# Patient Record
Sex: FEMALE | NOT HISPANIC OR LATINO | URBAN - METROPOLITAN AREA
[De-identification: names, ages, dates, MRNs, and addresses within clinical notes are randomized per-mention and may not be internally consistent; named-entity substitution may affect disease eponyms.]

---

## 2017-08-02 ENCOUNTER — IMPORTED ENCOUNTER (OUTPATIENT)
Dept: URBAN - METROPOLITAN AREA CLINIC 1 | Facility: CLINIC | Age: 32
End: 2017-08-02

## 2017-08-02 PROBLEM — H52.13: Noted: 2017-08-02

## 2017-08-02 PROCEDURE — S0621 ROUTINE OPHTHALMOLOGICAL EXA: HCPCS

## 2017-08-02 NOTE — PATIENT DISCUSSION
1. Myopia: Rx was given for correction if indicated and requested. 2. CC- Pt prefers dailies. Trialed- Acuvue Oasys 1 day Torics  (samples given. )3. Return for an appointment for a cc in 1 week with Dr. Montoya.

## 2018-09-19 ENCOUNTER — IMPORTED ENCOUNTER (OUTPATIENT)
Dept: URBAN - METROPOLITAN AREA CLINIC 1 | Facility: CLINIC | Age: 33
End: 2018-09-19

## 2018-09-19 PROBLEM — H52.13: Noted: 2018-09-19

## 2018-09-19 PROCEDURE — S0621 ROUTINE OPHTHALMOLOGICAL EXA: HCPCS

## 2018-09-19 NOTE — PATIENT DISCUSSION
Allergic Conjunctivitis OU 3.  GPC OU 4. Pinguecula OS My Day Toric CTL trials givenReturn for an appointment in 1 week cc with Dr. Alicia Ledezma.

## 2018-09-19 NOTE — PATIENT DISCUSSION
1. Myopia: Rx was given for correction if indicated and requested. 2. Allergic Conjunctivitis OU - Zaditor BID OU PRN for itching 3. GPC OU 4. Pinguecula OS My Day Toric CTL trials givenReturn for an appointment in 1 week cc with Dr. Neftaly Cueto. Return for an appointment in 1 year 36 with Dr. Neftaly Cueto.

## 2019-10-03 NOTE — PATIENT DISCUSSION
"""discussed with patient that her vision may be affected by her macular history vs pco. pco is not ""

## 2020-08-04 NOTE — PATIENT DISCUSSION
The patient expressed a desire to see through the full range of vision from distance, to middle, to near without glasses. The limitations of advanced lens technology were reviewed and the recommendation was made for an extended depth of focus lens (Symfony) in combination with a multifocal lens. Patient understands that each lens will provide only 2 of the 3 ranges of vision. Side effects, specifically halos, reduced contrast, and a 1 in 500 exchange rate due to failure to adapt to the lens were discussed as was the need for enhancement in some cases. The patient elects to proceed with Advance Vision Symfony OD, goal of emmetropia.

## 2020-08-27 ENCOUNTER — IMPORTED ENCOUNTER (OUTPATIENT)
Dept: URBAN - METROPOLITAN AREA CLINIC 1 | Facility: CLINIC | Age: 35
End: 2020-08-27

## 2020-08-27 PROBLEM — H52.13: Noted: 2020-08-27

## 2020-08-27 PROBLEM — H52.223: Noted: 2020-08-27

## 2020-08-27 PROCEDURE — S0621 ROUTINE OPHTHALMOLOGICAL EXA: HCPCS

## 2020-08-27 NOTE — PATIENT DISCUSSION
1. Myopia w/ Astigmatism OU -- Rx was given for correction if indicated and requested. 2. Allergic Conjunctivitis OU - Zaditor BID OU PRN for itching 3. GPC OU -- 4. Pinguecula OS --CTL Trials given (My day Toric). Return for an appointment in 1 week ccwith Dr. Nerissa Cabrera.

## 2022-01-06 NOTE — PATIENT DISCUSSION
Discussed drop regiment with the patient. Recommended patient to use PFAT 4-6x/day. Do warm compresses OU bid. Recommended eye mask for warm compresses. Discussed the importance of lid hygiene, recommended patient to continue using lid scrubs OU bid.

## 2022-04-02 ASSESSMENT — VISUAL ACUITY
OD_SC: J1+
OD_CC: J1+
OD_SC: 20/20
OS_SC: J1+
OS_CC: J1
OD_CC: J1
OS_CC: J1+
OS_SC: 20/20
OS_SC: 20/20
OD_SC: 20/20
OD_SC: 20/20
OS_SC: 20/20

## 2022-04-02 ASSESSMENT — TONOMETRY
OD_IOP_MMHG: 16
OS_IOP_MMHG: 17
OS_IOP_MMHG: 17
OD_IOP_MMHG: 16
OS_IOP_MMHG: 15
OD_IOP_MMHG: 17

## 2023-01-12 NOTE — PATIENT DISCUSSION
Patient is followed by Dr Tsering Siddiqi at [facility] - [facility] annually, next appt is in about 1 month.

## 2023-04-05 ENCOUNTER — NEW PATIENT (OUTPATIENT)
Dept: URBAN - METROPOLITAN AREA CLINIC 1 | Facility: CLINIC | Age: 38
End: 2023-04-05

## 2023-04-05 DIAGNOSIS — H52.13: ICD-10-CM

## 2023-04-05 DIAGNOSIS — Z01.00: ICD-10-CM

## 2023-04-05 DIAGNOSIS — H52.223: ICD-10-CM

## 2023-04-05 PROCEDURE — 92310-3 LEVEL 3 CONTACT LENS MANAGEMENT

## 2023-04-05 PROCEDURE — 92015 DETERMINE REFRACTIVE STATE: CPT

## 2023-04-05 PROCEDURE — 92004 COMPRE OPH EXAM NEW PT 1/>: CPT

## 2023-04-05 ASSESSMENT — VISUAL ACUITY
OS_CC: J1+
OD_CC: J1+
OS_CC: 20/20
OU_CC: J1+
OD_CC: 20/20

## 2023-04-05 ASSESSMENT — TONOMETRY
OD_IOP_MMHG: 22
OS_IOP_MMHG: 15

## 2023-04-20 ENCOUNTER — CONTACT LENSES/GLASSES VISIT (OUTPATIENT)
Dept: URBAN - METROPOLITAN AREA CLINIC 1 | Facility: CLINIC | Age: 38
End: 2023-04-20

## 2023-04-20 DIAGNOSIS — Z46.0: ICD-10-CM

## 2023-04-20 PROCEDURE — 92310-F CONTACT LENS FITTING FOLLOW UP

## 2023-04-20 ASSESSMENT — VISUAL ACUITY
OS_CC: J1+
OD_CC: 20/20
OD_CC: J1+
OS_CC: 20/20

## 2024-04-24 ENCOUNTER — COMPREHENSIVE EXAM (OUTPATIENT)
Dept: URBAN - METROPOLITAN AREA CLINIC 1 | Facility: CLINIC | Age: 39
End: 2024-04-24

## 2024-04-24 DIAGNOSIS — H52.13: ICD-10-CM

## 2024-04-24 DIAGNOSIS — Z46.0: ICD-10-CM

## 2024-04-24 DIAGNOSIS — Z01.00: ICD-10-CM

## 2024-04-24 DIAGNOSIS — H52.223: ICD-10-CM

## 2024-04-24 PROCEDURE — 92015 DETERMINE REFRACTIVE STATE: CPT

## 2024-04-24 PROCEDURE — 92014 COMPRE OPH EXAM EST PT 1/>: CPT

## 2024-04-24 ASSESSMENT — TONOMETRY
OD_IOP_MMHG: 15
OS_IOP_MMHG: 16

## 2024-04-24 ASSESSMENT — VISUAL ACUITY
OS_CC: J1+
OD_CC: J1+
OS_CC: 20/20
OD_CC: 20/20

## 2025-04-24 ENCOUNTER — COMPREHENSIVE EXAM (OUTPATIENT)
Age: 40
End: 2025-04-24

## 2025-04-24 DIAGNOSIS — Z46.0: ICD-10-CM

## 2025-04-24 DIAGNOSIS — Z01.00: ICD-10-CM

## 2025-04-24 DIAGNOSIS — H52.13: ICD-10-CM

## 2025-04-24 DIAGNOSIS — H52.223: ICD-10-CM

## 2025-04-24 PROCEDURE — 92310-3 LEVEL 3 SOFT LENS UPDATE

## 2025-04-24 PROCEDURE — 92015 DETERMINE REFRACTIVE STATE: CPT

## 2025-04-24 PROCEDURE — 92014 COMPRE OPH EXAM EST PT 1/>: CPT
